# Patient Record
Sex: MALE | Race: WHITE | NOT HISPANIC OR LATINO
[De-identification: names, ages, dates, MRNs, and addresses within clinical notes are randomized per-mention and may not be internally consistent; named-entity substitution may affect disease eponyms.]

---

## 2024-02-29 ENCOUNTER — APPOINTMENT (OUTPATIENT)
Dept: UROLOGY | Facility: CLINIC | Age: 24
End: 2024-02-29
Payer: MEDICAID

## 2024-02-29 VITALS
HEIGHT: 63 IN | WEIGHT: 132 LBS | DIASTOLIC BLOOD PRESSURE: 71 MMHG | OXYGEN SATURATION: 96 % | HEART RATE: 73 BPM | BODY MASS INDEX: 23.39 KG/M2 | SYSTOLIC BLOOD PRESSURE: 113 MMHG

## 2024-02-29 DIAGNOSIS — Z78.9 OTHER SPECIFIED HEALTH STATUS: ICD-10-CM

## 2024-02-29 DIAGNOSIS — N46.11 ORGANIC OLIGOSPERMIA: ICD-10-CM

## 2024-02-29 DIAGNOSIS — Z98.890 OTHER SPECIFIED POSTPROCEDURAL STATES: ICD-10-CM

## 2024-02-29 PROBLEM — Z00.00 ENCOUNTER FOR PREVENTIVE HEALTH EXAMINATION: Status: ACTIVE | Noted: 2024-02-29

## 2024-02-29 LAB
BILIRUB UR QL STRIP: NORMAL
CLARITY UR: CLEAR
COLLECTION METHOD: NORMAL
GLUCOSE UR-MCNC: NORMAL
HCG UR QL: 0.2 EU/DL
HGB UR QL STRIP.AUTO: NORMAL
KETONES UR-MCNC: NORMAL
LEUKOCYTE ESTERASE UR QL STRIP: NORMAL
NITRITE UR QL STRIP: NORMAL
PH UR STRIP: 7.5
PROT UR STRIP-MCNC: NORMAL
SP GR UR STRIP: 1.02

## 2024-02-29 PROCEDURE — 99204 OFFICE O/P NEW MOD 45 MIN: CPT

## 2024-02-29 RX ORDER — FLUOXETINE HYDROCHLORIDE 40 MG/1
CAPSULE ORAL
Refills: 0 | Status: ACTIVE | COMMUNITY

## 2024-02-29 RX ORDER — GRANISETRON HYDROCHLORIDE 1 MG/1
1 TABLET, FILM COATED ORAL
Refills: 0 | Status: ACTIVE | COMMUNITY

## 2024-04-08 ENCOUNTER — APPOINTMENT (OUTPATIENT)
Dept: UROLOGY | Facility: CLINIC | Age: 24
End: 2024-04-08
Payer: MEDICAID

## 2024-04-08 VITALS
BODY MASS INDEX: 23.39 KG/M2 | DIASTOLIC BLOOD PRESSURE: 75 MMHG | OXYGEN SATURATION: 95 % | HEIGHT: 63 IN | WEIGHT: 132 LBS | TEMPERATURE: 98.5 F | SYSTOLIC BLOOD PRESSURE: 115 MMHG | HEART RATE: 64 BPM

## 2024-04-08 PROCEDURE — 99215 OFFICE O/P EST HI 40 MIN: CPT

## 2024-04-08 PROCEDURE — G2211 COMPLEX E/M VISIT ADD ON: CPT | Mod: NC,1L

## 2024-04-08 NOTE — ASSESSMENT
[FreeTextEntry1] : The semen analysis have both been post vaginal 1 showed no sperm 1 showed twitching sperm though I do not know if that is inherently in the sperm or is due to sperm and vaginal interaction.  Obviously if the sperm are otherwise okay and there is problems with vaginal interaction for example antibodies then IUI would overcome it but that is a diagnosis of exclusion. Right now the physical exam shows a visible left varicocele in supine I did not have him stand up as i am sending him for an ultrasound.  The testicles are smaller and softer than expected and I will have exact measurements from the ultrasound.  His hirsute pattern is normal but I am going to get a full hormone panel especially as the bloods that he had drawn, a free testosterone of 13.2, sex hormone binding globulin at 25.6 and a 17 hyDroxy progesterone 104 and September 8 were done with him being listed as a female and I do not have the rest of the parameters that I would like.  If canelo restrepo would allow I would want a semen analysis with the semen collection device so we could avoid the confounding issue of sperm vaginal interaction.  It is unlikely to be related at this point in time but we will see what the other studies show and go on from there  To summarize I believe he has a left probably bilateral varicoceles with testicles are smaller and softer than expected we will get hormone studies, genetic testing, scrotal ultrasound supine and upright, if the  allows us to assist with the semen collection device and then they will come back.

## 2024-04-08 NOTE — LETTER BODY
[Consult Letter:] : I had the pleasure of evaluating your patient, [unfilled]. [Dear  ___] : Dear ~DAVION, [Sincerely,] : Sincerely, [Consult Closing:] : Thank you very much for allowing me to participate in the care of this patient.  If you have any questions, please do not hesitate to contact me. [Please see my note below.] : Please see my note below. [FreeTextEntry2] : MARSHALL Del Cid MD

## 2024-04-08 NOTE — ASSESSMENT
[FreeTextEntry1] : Their rabbi have the rest of the blood results unfortunately the semen analysis is not yet complete.  What we have here is a very large left varicocele borderline right but the right side does reflux and if we fix the left with the right then start to reflux.  My pattern has been once he is on the table to fix both as I do not want to have to bring him back especially since both testicles appear to have atrophy.  There is nothing to do here hormonally the numbers are all good enough I cannot improve his sperm function and manipulating his hormones.  If the semen analysis has low-volume we will need a post ejaculatory urinalysis as it most of the sperm is going backwards we can alkalinize his urine hardest the sperm and then do intrauterine insemination.  If the semen analysis has normal volume and if the rabbi is correct that it showing sperm but limited numbers then we need to fix his varicocele.  The [facility] has the ability to have the couple do a telemedicine visit in their hometown so they do not have to travel here we will wait and schedule that once the semen analysis is back and see if we need to do other testing before we need to discuss the risk benefits of varicocele ligation

## 2024-04-08 NOTE — ADDENDUM
[FreeTextEntry1] : We have trouble getting the results eventually having communication with his Jenniferbi who is his medical advisor discussing the findings and options and then discussing them again with the patient and his wife

## 2024-04-08 NOTE — LETTER HEADER
[FreeTextEntry3] : Brianda Deshpande MD [facility] [facility], [address] [place] [address]

## 2024-04-08 NOTE — LETTER HEADER
[FreeTextEntry3] : Brianda Deshpande MD [facility] [facility] [place] [address]

## 2024-04-08 NOTE — PHYSICAL EXAM
[Normal Appearance] : normal appearance [Heart Rate And Rhythm] : heart rate and rhythm were normal [Well Groomed] : well groomed [General Appearance - In No Acute Distress] : no acute distress [Edema] : no peripheral edema [Exaggerated Use Of Accessory Muscles For Inspiration] : no accessory muscle use [Respiration, Rhythm And Depth] : normal respiratory rhythm and effort [Auscultation Breath Sounds / Voice Sounds] : lungs were clear to auscultation bilaterally [Bowel Sounds] : normal bowel sounds [Abdomen Soft] : soft [Abdomen Tenderness] : non-tender [Abdomen Hernia] : no hernia was discovered [Costovertebral Angle Tenderness] : no ~M costovertebral angle tenderness [Urethral Meatus] : meatus normal [Penis Abnormality] : normal circumcised penis [Urinary Bladder Findings] : the bladder was normal on palpation [Epididymis] : the epididymides were normal [Testes Tenderness] : no tenderness of the testes [Normal Station and Gait] : the gait and station were normal for the patient's age [] : no rash [No Focal Deficits] : no focal deficits [Oriented To Time, Place, And Person] : oriented to person, place, and time [Mood] : the mood was normal [Affect] : the affect was normal [Not Anxious] : not anxious [FreeTextEntry1] : BMI = 23 [de-identified] : large left small right varicocele, testes with mild atrophy

## 2024-04-08 NOTE — HISTORY OF PRESENT ILLNESS
[FreeTextEntry1] : Zee Is a 23-year-old male born November 18, 2000 who has been  to Emy, a 20-year-old female born April 3, 2001 for 2 years without the use of contraception unfortunately as of yet she has not become pregnant.  Post vaginal samples she has severe oligospermia and they did a semen collection device sample but due to her cycle they were only able to do it this past Friday and the result is not yet available.  Reportedly there was sperm question is how many.  The exam showed bilateral varicoceles left greater than right both testicles have mild atrophy.  I wanted hormone studies, genetic testing a scrotal ultrasound and as above the semen analysis. Blood tests March 6, 2024 Karyotype was 46 XY Urine analysis was just done as a dip at 1.020, pH 7.5 Hormonal studies were done March 5, 2024 Total testosterone was 338 Bioavailable testosterone was 103 Y microdeletion was normal Sex hormone binding globulin was 31.4 Liver function tests were normal Hemoglobin was 14 with a platelet count of 177,000 Total testosterone was 337.5 Bioavailable testosterone was 103 FSH was 2.6 LH was 7 Prolactin was 8.74 Estradiol was less than 25 Sex hormone binding globulin was 31.4  The semen analysis was done but the results are not yet available  Ultrasound was done at [facility] on March 7, 2024 Right testicle equals 8.9 cc with the veins going up to 2.6 mm without reflux Left testicle equals 6.8 cc veins go up to 6.3 mm with reflux

## 2024-04-08 NOTE — HISTORY OF PRESENT ILLNESS
[None] : no symptoms [FreeTextEntry1] : Zee Is a 23-year-old male born November 18, 2000 who has been  to Emy, a 20-year-old female born April 3, 2001 for 2 years without the use of contraception unfortunately as of yet she has not become pregnant.  Neither has had other partners with whom they could have induced a pregnancy.  He has no past urologic history including the absence of being told about hydrocele, varicocele or cryptorchidism and has no trouble with voiding with no blood no urgency urinary frequency.  He has no prior medical problems other than a circumcision no surgical history he has no known allergies and takes Granisetron and Prozac.   They have no trouble with relations being together 2-3 times a week during the clean portion of her cycle he has anxiety for which he is taking the Prozac, never smoked, has minimal alcohol use does not use recreational drugs. He is a student in [employer] with the use of oil as a lubricant they know when she is ovulating and make sure to have sexual encounters during that time. They had two post vaginal semen analysis .1 was done November 28 and showed 1-2 sperm per high-powered field on a spot sample and 1 on December 19 showed no sperm even on the spun sample.  pH was 7.4 and 8.3, volume was 2 and 3 mL respectively morphology was not studied.  She has not had an evaluation, her cycle lasts 30-40, Mikvah day 12 and her evaluation showed no trouble with ovulation.

## 2024-04-08 NOTE — PHYSICAL EXAM
[General Appearance - Well Nourished] : well nourished [Normal Appearance] : normal appearance [Well Groomed] : well groomed [General Appearance - In No Acute Distress] : no acute distress [Edema] : no peripheral edema [Respiration, Rhythm And Depth] : normal respiratory rhythm and effort [Exaggerated Use Of Accessory Muscles For Inspiration] : no accessory muscle use [Normal Station and Gait] : the gait and station were normal for the patient's age [] : no rash [No Focal Deficits] : no focal deficits [Oriented To Time, Place, And Person] : oriented to person, place, and time [Affect] : the affect was normal [Mood] : the mood was normal

## 2024-04-08 NOTE — LETTER BODY
[Dear  ___] : Dear ~DAVION, [Consult Letter:] : I had the pleasure of evaluating your patient, [unfilled]. [Please see my note below.] : Please see my note below. [Consult Closing:] : Thank you very much for allowing me to participate in the care of this patient.  If you have any questions, please do not hesitate to contact me. [Sincerely,] : Sincerely, [FreeTextEntry2] : MARSHALL Del Cid MD

## 2024-05-08 ENCOUNTER — TRANSCRIPTION ENCOUNTER (OUTPATIENT)
Age: 24
End: 2024-05-08

## 2024-05-08 ENCOUNTER — APPOINTMENT (OUTPATIENT)
Dept: UROLOGY | Facility: CLINIC | Age: 24
End: 2024-05-08
Payer: MEDICAID

## 2024-05-08 PROCEDURE — G2211 COMPLEX E/M VISIT ADD ON: CPT | Mod: NC,1L

## 2024-05-08 PROCEDURE — 99215 OFFICE O/P EST HI 40 MIN: CPT

## 2024-05-08 NOTE — LETTER BODY
[Dear  ___] : Dear ~DAVION, [Courtesy Letter:] : I had the pleasure of seeing your patient, [unfilled], in my office today. [Please see my note below.] : Please see my note below. [Sincerely,] : Sincerely, [FreeTextEntry2] : MARSHALL Del Cid MD

## 2024-05-08 NOTE — HISTORY OF PRESENT ILLNESS
[FreeTextEntry1] : spoke with patient they are ready for telemed appt pharmacy is in chart pt was made aware that they could be called earlier   Zee Is a 23-year-old male born November 18, 2000 who has been  to Emy, a 20-year-old female born April 3, 2001 for 2 years without the use of contraception unfortunately as of yet she has not become pregnant.  At the last visit we were left with a large left borderline right varicocele numbers were good hormonally and we will waiting for the semen analysis results as I needed a post ejaculatory urine analysis if the volume was low and if he has a normal volume and limited sperm that we need to fix the varicoceles.  We arrange for TeleMed visit once we have the semen analysis so we can decide if we need to do other testing or discussed the risk benefits of the varicocele ligation.  Semen analysis was done on April 5, 2024 Volume was 3 mL pH was 8 Concentration was 3,000,000/mL with 9 million total Motility was 23% with For progression to +3 Percent normal morphology was 19 (>/= 30) Morphology had 19% tapered and the sample had cryopreservation.  Please note there were 6 brown cells per mL and there were 29% granulocytes which turned out to be 1.7 million per mL were normal is less than 1 million    [None] : no symptoms

## 2024-05-08 NOTE — ASSESSMENT
[FreeTextEntry1] : We had a very long discussion of the findings on the semen analysis the implications the need for a sperm culture because of the white cells seen but the fact that with 3 mL of volume and a low number of sperm the neck step would be bilateral varicocele ligation.  We discussed various ways of doing it including the various methods of repair including embolization, microsurgical, laparoscopic and the method I recommend the microsurgical approach.  We had an in depth conversation regarding the benefit of varicocelectomy today. He understands the literature which overwhelming reports a benefit in improvement in semen parameters. He also understands that there is limited data in terms of spontaneous pregnancy and take home baby rates.There is good data suggesting improved semen parameters which can possibly obviate the need for future IVF and has been shown to improve IVF outcomes in selected patients. We discussed the recent results of a [facility] review which reported that one natural pregnancy is achieved for every 3-5 varicocele repairs over an unknown time frame. We also spoke about the fact that there is a 3 to 6 month delay before improvement is seen in semen parameters. The role of advancing maternal age was discussed in depth.  Surgical risks, including a risk of infection, injury to the testicular artery (extremely rare), injury to the vas deferens and hydrocele were discussed, as was post operative pain and pain control. Post operative skin numbness in the anterior thigh/lateral scrotum were also discussed.  They will speak to their rabbi both about the semen sample for culture and if to go ahead went to do it and my  will contact them on Monday at [phone]

## 2024-05-08 NOTE — LETTER HEADER
[FreeTextEntry3] : Brianda Deshpande MD [facility] [facility], [address] [place] [address]

## 2024-05-08 NOTE — ADDENDUM
[FreeTextEntry1] : The patient-doctor relationship has been established in a face to face fashion via real time video/audio HIPAA compliant communication using telemedicine software.  The patient's identity has been confirmed.  The patient was previously emailed a copy of the telemedicine consent.  They have had a chance to review and has now given verbal consent and has requested care to be assessed and treated through telemedicine and understands there maybe limitations in this process and they may need further follow up care in the office and or hospital settings.

## 2024-05-20 ENCOUNTER — OUTPATIENT (OUTPATIENT)
Dept: OUTPATIENT SERVICES | Facility: HOSPITAL | Age: 24
LOS: 1 days | End: 2024-05-20
Payer: MEDICAID

## 2024-05-20 VITALS
SYSTOLIC BLOOD PRESSURE: 121 MMHG | WEIGHT: 132.06 LBS | HEART RATE: 58 BPM | TEMPERATURE: 98 F | DIASTOLIC BLOOD PRESSURE: 67 MMHG | HEIGHT: 63 IN | OXYGEN SATURATION: 100 % | RESPIRATION RATE: 16 BRPM

## 2024-05-20 DIAGNOSIS — I86.1 SCROTAL VARICES: ICD-10-CM

## 2024-05-20 DIAGNOSIS — Z01.818 ENCOUNTER FOR OTHER PREPROCEDURAL EXAMINATION: ICD-10-CM

## 2024-05-20 LAB
APPEARANCE UR: CLEAR — SIGNIFICANT CHANGE UP
BILIRUB UR-MCNC: NEGATIVE — SIGNIFICANT CHANGE UP
COLOR SPEC: YELLOW — SIGNIFICANT CHANGE UP
DIFF PNL FLD: NEGATIVE — SIGNIFICANT CHANGE UP
GLUCOSE UR QL: NEGATIVE MG/DL — SIGNIFICANT CHANGE UP
HCV AB S/CO SERPL IA: 0.06 COI — SIGNIFICANT CHANGE UP
HCV AB SERPL-IMP: SIGNIFICANT CHANGE UP
KETONES UR-MCNC: NEGATIVE MG/DL — SIGNIFICANT CHANGE UP
LEUKOCYTE ESTERASE UR-ACNC: NEGATIVE — SIGNIFICANT CHANGE UP
NITRITE UR-MCNC: NEGATIVE — SIGNIFICANT CHANGE UP
PH UR: 6.5 — SIGNIFICANT CHANGE UP (ref 5–8)
PROT UR-MCNC: NEGATIVE MG/DL — SIGNIFICANT CHANGE UP
SP GR SPEC: 1.01 — SIGNIFICANT CHANGE UP (ref 1–1.03)
UROBILINOGEN FLD QL: 0.2 MG/DL — SIGNIFICANT CHANGE UP (ref 0.2–1)

## 2024-05-20 PROCEDURE — 86803 HEPATITIS C AB TEST: CPT

## 2024-05-20 PROCEDURE — 81003 URINALYSIS AUTO W/O SCOPE: CPT

## 2024-05-20 PROCEDURE — 36415 COLL VENOUS BLD VENIPUNCTURE: CPT

## 2024-05-20 PROCEDURE — 87086 URINE CULTURE/COLONY COUNT: CPT

## 2024-05-20 PROCEDURE — 99214 OFFICE O/P EST MOD 30 MIN: CPT | Mod: 25

## 2024-05-20 NOTE — H&P PST ADULT - LANGUAGE ASSISTANCE NEEDED
No-Patient/Caregiver offered and refused free interpretation services. Surgeon speaks yiddish/No-Patient/Caregiver offered and refused free interpretation services.

## 2024-05-20 NOTE — H&P PST ADULT - REASON FOR ADMISSION
___ yo male/female? presents for PAST in preparation for ___ on ____ under (anesthesia) by  ? (OR?). 24 yo male presents for PAST in preparation for bilateral subinguinal microsurgical varicocelectomy on 5/28/2024 under general anesthesia by Dr. Deshpande ([facility]).

## 2024-05-20 NOTE — H&P PST ADULT - NSANTHOSAYNRD_GEN_A_CORE
No. SIENA screening performed.  STOP BANG Legend: 0-2 = LOW Risk; 3-4 = INTERMEDIATE Risk; 5-8 = HIGH Risk

## 2024-05-20 NOTE — H&P PST ADULT - HISTORY OF PRESENT ILLNESS
Pt complains of _____.     Denies any chest pain, difficulty breathing, SOB, palpitations, dysuria, URI, or any other infections in the last 2 weeks/1 month. Denies any suicidal or homicidal ideations. SIENA reviewed with patient.     Endorses this is their full medical & surgical history including medications prescribed. Pt verbalized understanding of all pre-operative instructions and was given the opportunity to ask questions and have them answered. They were instructed to follow up with their surgeon/proceduralists office with any additional questions regarding their procedure.    Anesthesia Alert  NO--Difficult Airway  NO--History of neck surgery or radiation  NO--Limited ROM of neck  NO--History of Malignant hyperthermia  NO--No personal or family history of Pseudocholinesterase deficiency.  NO--Prior Anesthesia Complication  NO--Latex Allergy  NO--Loose teeth  NO--History of Rheumatoid Arthritis  NO--SIENA  NO--Bleeding Risk  NO--Other_____    RCRI score:  Pt is mostly Yiddish speaking. Via wife, Emy, states pt has a varicocele that needs to be addressed as they have been having issues with conceiving Pt denies any symptoms/concerns at this time.    Denies any chest pain, difficulty breathing, SOB, palpitations, dysuria, URI, or any other infections in the last 2 weeks. Denies any suicidal or homicidal ideations. SIENA reviewed with patient.     Endorses this is their full medical & surgical history including medications prescribed. Pt verbalized understanding of all pre-operative instructions and was given the opportunity to ask questions and have them answered. They were instructed to follow up with their surgeon/proceduralists office with any additional questions regarding their procedure.    Anesthesia Alert  NO--Difficult Airway  NO--History of neck surgery or radiation  NO--Limited ROM of neck  NO--History of Malignant hyperthermia  NO--No personal or family history of Pseudocholinesterase deficiency.  NO--Prior Anesthesia Complication  NO--Latex Allergy  NO--Loose teeth  NO--History of Rheumatoid Arthritis  NO--SIENA  NO--Bleeding Risk  NO--Other_____    Duke Activity Status Index (DASI)  RESULT SUMMARY:  58.2 points  The higher the score (maximum 58.2), the higher the functional status.    9.89 METs    INPUTS:  Take care of self —> 2.75 = Yes  Walk indoors —> 1.75 = Yes  Walk 1&ndash;2 blocks on level ground —> 2.75 = Yes  Climb a flight of stairs or walk up a hill —> 5.5 = Yes  Run a short distance —> 8 = Yes  Do light work around the house —> 2.7 = Yes  Do moderate work around the house —> 3.5 = Yes  Do heavy work around the house —> 8 = Yes  Do yardwork —> 4.5 = Yes  Have sexual relations —> 5.25 = Yes  Participate in moderate recreational activities —> 6 = Yes  Participate in strenuous sports —> 7.5 = Yes    Revised Cardiac Risk Index for Pre-Operative Risk  RESULT SUMMARY:  0 points  Class I Risk    3.9 %  30-day risk of death, MI, or cardiac arrest    From Duceppe 2017. These numbers are higher than those from the original study (Moiz 1999). See Evidence for details.    INPUTS:  Elevated-risk surgery —> 0 = No  History of ischemic heart disease —> 0 = No  History of congestive heart failure —> 0 = No  History of cerebrovascular disease —> 0 = No  Pre-operative treatment with insulin —> 0 = No  Pre-operative creatinine >2 mg/dL / 176.8 µmol/L —> 0 = No

## 2024-05-21 DIAGNOSIS — I86.1 SCROTAL VARICES: ICD-10-CM

## 2024-05-21 DIAGNOSIS — Z01.818 ENCOUNTER FOR OTHER PREPROCEDURAL EXAMINATION: ICD-10-CM

## 2024-05-21 LAB
CULTURE RESULTS: SIGNIFICANT CHANGE UP
SPECIMEN SOURCE: SIGNIFICANT CHANGE UP

## 2024-05-24 NOTE — ASU PATIENT PROFILE, ADULT - LANGUAGE ASSISTANCE NEEDED
Yes-Patient/Caregiver accepts free interpretation services... Yiddsabino  not available. Spouse translating./No-Patient/Caregiver offered and refused free interpretation services.

## 2024-05-24 NOTE — ASU PATIENT PROFILE, ADULT - FALL HARM RISK - UNIVERSAL INTERVENTIONS
Bed in lowest position, wheels locked, appropriate side rails in place/Call bell, personal items and telephone in reach/Instruct patient to call for assistance before getting out of bed or chair/Non-slip footwear when patient is out of bed/[place] to call system/Physically safe environment - no spills, clutter or unnecessary equipment/Purposeful Proactive Rounding/Room/bathroom lighting operational, light cord in reach

## 2024-05-28 ENCOUNTER — TRANSCRIPTION ENCOUNTER (OUTPATIENT)
Age: 24
End: 2024-05-28

## 2024-05-28 ENCOUNTER — OUTPATIENT (OUTPATIENT)
Dept: OUTPATIENT SERVICES | Facility: HOSPITAL | Age: 24
LOS: 1 days | Discharge: HOME | End: 2024-05-28
Payer: MEDICAID

## 2024-05-28 ENCOUNTER — APPOINTMENT (OUTPATIENT)
Dept: UROLOGY | Facility: HOSPITAL | Age: 24
End: 2024-05-28

## 2024-05-28 VITALS
WEIGHT: 134.92 LBS | DIASTOLIC BLOOD PRESSURE: 61 MMHG | TEMPERATURE: 98 F | OXYGEN SATURATION: 98 % | SYSTOLIC BLOOD PRESSURE: 118 MMHG | HEIGHT: 63 IN | HEART RATE: 65 BPM | RESPIRATION RATE: 17 BRPM

## 2024-05-28 VITALS
HEART RATE: 78 BPM | SYSTOLIC BLOOD PRESSURE: 118 MMHG | RESPIRATION RATE: 17 BRPM | OXYGEN SATURATION: 100 % | DIASTOLIC BLOOD PRESSURE: 62 MMHG

## 2024-05-28 DIAGNOSIS — I86.1 SCROTAL VARICES: ICD-10-CM

## 2024-05-28 PROCEDURE — 55530 REVISE SPERMATIC CORD VEINS: CPT | Mod: 50

## 2024-05-28 RX ORDER — OXYCODONE AND ACETAMINOPHEN 5; 325 MG/1; MG/1
1 TABLET ORAL ONCE
Refills: 0 | Status: DISCONTINUED | OUTPATIENT
Start: 2024-05-28 | End: 2024-05-28

## 2024-05-28 RX ORDER — ERYTHROMYCIN BASE 5 MG/GRAM
1 OINTMENT (GRAM) OPHTHALMIC (EYE) EVERY 8 HOURS
Refills: 0 | Status: DISCONTINUED | OUTPATIENT
Start: 2024-05-28 | End: 2024-05-28

## 2024-05-28 RX ORDER — ACETAMINOPHEN 500 MG
650 TABLET ORAL ONCE
Refills: 0 | Status: COMPLETED | OUTPATIENT
Start: 2024-05-28 | End: 2024-05-28

## 2024-05-28 RX ORDER — MEPERIDINE HYDROCHLORIDE 50 MG/ML
12.5 INJECTION INTRAMUSCULAR; INTRAVENOUS; SUBCUTANEOUS
Refills: 0 | Status: DISCONTINUED | OUTPATIENT
Start: 2024-05-28 | End: 2024-05-28

## 2024-05-28 RX ORDER — OFLOXACIN 0.3 %
1 DROPS OPHTHALMIC (EYE)
Qty: 1 | Refills: 0
Start: 2024-05-28 | End: 2024-05-30

## 2024-05-28 RX ORDER — SODIUM CHLORIDE 9 MG/ML
1000 INJECTION, SOLUTION INTRAVENOUS
Refills: 0 | Status: DISCONTINUED | OUTPATIENT
Start: 2024-05-28 | End: 2024-05-28

## 2024-05-28 RX ORDER — FLUOXETINE HCL 10 MG
0 CAPSULE ORAL
Refills: 0 | DISCHARGE

## 2024-05-28 RX ORDER — HYDROMORPHONE HYDROCHLORIDE 2 MG/ML
1 INJECTION INTRAMUSCULAR; INTRAVENOUS; SUBCUTANEOUS
Refills: 0 | Status: DISCONTINUED | OUTPATIENT
Start: 2024-05-28 | End: 2024-05-28

## 2024-05-28 RX ORDER — HYDROMORPHONE HYDROCHLORIDE 2 MG/ML
0.5 INJECTION INTRAMUSCULAR; INTRAVENOUS; SUBCUTANEOUS
Refills: 0 | Status: DISCONTINUED | OUTPATIENT
Start: 2024-05-28 | End: 2024-05-28

## 2024-05-28 RX ORDER — ONDANSETRON 8 MG/1
4 TABLET, FILM COATED ORAL ONCE
Refills: 0 | Status: DISCONTINUED | OUTPATIENT
Start: 2024-05-28 | End: 2024-05-28

## 2024-05-28 RX ORDER — OXYCODONE AND ACETAMINOPHEN 5; 325 MG/1; MG/1
2 TABLET ORAL ONCE
Refills: 0 | Status: DISCONTINUED | OUTPATIENT
Start: 2024-05-28 | End: 2024-05-28

## 2024-05-28 RX ORDER — ACETAMINOPHEN 500 MG
2 TABLET ORAL
Qty: 0 | Refills: 0 | DISCHARGE
Start: 2024-05-28

## 2024-05-28 RX ADMIN — Medication 650 MILLIGRAM(S): at 15:20

## 2024-05-28 RX ADMIN — Medication 1 APPLICATION(S): at 14:36

## 2024-05-28 RX ADMIN — SODIUM CHLORIDE 100 MILLILITER(S): 9 INJECTION, SOLUTION INTRAVENOUS at 14:33

## 2024-05-28 RX ADMIN — Medication 650 MILLIGRAM(S): at 16:03

## 2024-05-28 NOTE — CHART NOTE - NSCHARTNOTEFT_GEN_A_CORE
Eval for b/l corneal abrasion  pain and erythema  no vision changes  improvement with tetracaine eye drops  ordered erythromycin eye drops q8 hours x 3 doses  informed patient and wife to return to ED if symptoms don't resolve or vision changes

## 2024-05-28 NOTE — ASU PREOP CHECKLIST - NSBLOODTRANS_GEN_A_CORE_SIUH
Patient was seen 6/23/17, reminded / orders placed for FIT, Mammo, will posptone this 3 months to see if tests are completed. Laz PARKER CMA     no...

## 2024-05-28 NOTE — ASU DISCHARGE PLAN (ADULT/PEDIATRIC) - CARE PROVIDER_API CALL
Brianda Deshpande.  Urology  [address] [place] [address]  Phone: [phone]  Fax: [phone]  Follow Up Time:

## 2024-05-28 NOTE — ASU DISCHARGE PLAN (ADULT/PEDIATRIC) - ASU DC SPECIAL INSTRUCTIONSFT
Follow up with Dr Deshpande in 2-3 weeks, call for appointment. Follow up with Dr Deshpande in 2-3 weeks, call for appointment.  weight x 24 hours with ice

## 2024-05-30 ENCOUNTER — APPOINTMENT (OUTPATIENT)
Dept: UROLOGY | Facility: CLINIC | Age: 24
End: 2024-05-30

## 2024-05-30 ENCOUNTER — NON-APPOINTMENT (OUTPATIENT)
Age: 24
End: 2024-05-30

## 2024-06-03 DIAGNOSIS — I86.1 SCROTAL VARICES: ICD-10-CM

## 2024-06-03 DIAGNOSIS — F41.9 ANXIETY DISORDER, UNSPECIFIED: ICD-10-CM

## 2024-06-06 PROBLEM — F41.9 ANXIETY DISORDER, UNSPECIFIED: Chronic | Status: ACTIVE | Noted: 2024-05-20

## 2024-06-07 ENCOUNTER — APPOINTMENT (OUTPATIENT)
Dept: UROLOGY | Facility: CLINIC | Age: 24
End: 2024-06-07
Payer: MEDICAID

## 2024-06-07 VITALS
HEART RATE: 70 BPM | BODY MASS INDEX: 23.74 KG/M2 | WEIGHT: 134 LBS | HEIGHT: 63 IN | TEMPERATURE: 97.9 F | OXYGEN SATURATION: 97 % | SYSTOLIC BLOOD PRESSURE: 109 MMHG | DIASTOLIC BLOOD PRESSURE: 62 MMHG

## 2024-06-07 DIAGNOSIS — I86.1 SCROTAL VARICES: ICD-10-CM

## 2024-06-07 PROCEDURE — 99024 POSTOP FOLLOW-UP VISIT: CPT

## 2024-06-07 NOTE — HISTORY OF PRESENT ILLNESS
[FreeTextEntry1] : Zee Is a 23-year-old male born November 18, 2000 last seen May 28, 2024 at which point he had a bilateral subinguinal microsurgical varicocelectomy.  Since the operation they say that he has done pretty well.  There is sign of infection and he has been able to function normally without difficulty.  He is here for wound check and discussion No

## 2024-06-07 NOTE — PHYSICAL EXAM
[Normal Appearance] : normal appearance [Well Groomed] : well groomed [General Appearance - In No Acute Distress] : no acute distress [Edema] : no peripheral edema [Respiration, Rhythm And Depth] : normal respiratory rhythm and effort [Exaggerated Use Of Accessory Muscles For Inspiration] : no accessory muscle use [Abdomen Soft] : soft [Abdomen Tenderness] : non-tender [Costovertebral Angle Tenderness] : no ~M costovertebral angle tenderness [Normal Station and Gait] : the gait and station were normal for the patient's age [] : no rash [No Focal Deficits] : no focal deficits [Oriented To Time, Place, And Person] : oriented to person, place, and time [Affect] : the affect was normal [Mood] : the mood was normal [de-identified] : Incisions are healing well the left cord is thicker than the right both feel foam which is well within normal limits the penis has normal sensation.

## 2024-06-07 NOTE — LETTER HEADER
[FreeTextEntry3] : Brianda Deshpande MD [facility] [facility], [address] [place] [address]

## 2024-06-07 NOTE — ASSESSMENT
[FreeTextEntry1] : Incision looks great the what she calls a bubble is normal and as the sutures dissolve and the scarring settles the incision should flatten out.  There is no limitations on what they can do at this point  I reassured them that at this point they should not worry about fertility, if they will improve it does not happen before 3 and sometimes even at 6 months.  In some patients that actually can get temporarily worse due to the stress of surgery.  If at 6 months she is not yet pregnant they should start the process again getting a post vaginal semen analysis and then have their rabbi contact me.  I will set up an appointment for 6 months from now if there is a problem before that they will call

## 2024-12-09 ENCOUNTER — APPOINTMENT (OUTPATIENT)
Dept: UROLOGY | Facility: CLINIC | Age: 24
End: 2024-12-09